# Patient Record
Sex: FEMALE | Race: WHITE | NOT HISPANIC OR LATINO | Employment: FULL TIME | URBAN - NONMETROPOLITAN AREA
[De-identification: names, ages, dates, MRNs, and addresses within clinical notes are randomized per-mention and may not be internally consistent; named-entity substitution may affect disease eponyms.]

---

## 2021-04-13 ENCOUNTER — OFFICE VISIT (OUTPATIENT)
Dept: CARDIOLOGY | Facility: CLINIC | Age: 41
End: 2021-04-13

## 2021-04-13 VITALS
WEIGHT: 156 LBS | DIASTOLIC BLOOD PRESSURE: 102 MMHG | SYSTOLIC BLOOD PRESSURE: 140 MMHG | HEART RATE: 75 BPM | BODY MASS INDEX: 27.64 KG/M2 | TEMPERATURE: 98.6 F | HEIGHT: 63 IN

## 2021-04-13 DIAGNOSIS — I25.6 SILENT MYOCARDIAL ISCHEMIA: ICD-10-CM

## 2021-04-13 DIAGNOSIS — R00.2 PALPITATIONS: ICD-10-CM

## 2021-04-13 DIAGNOSIS — E03.9 HYPOTHYROIDISM, UNSPECIFIED TYPE: ICD-10-CM

## 2021-04-13 DIAGNOSIS — R53.83 FATIGUE, UNSPECIFIED TYPE: ICD-10-CM

## 2021-04-13 DIAGNOSIS — I10 ESSENTIAL HYPERTENSION: Primary | ICD-10-CM

## 2021-04-13 DIAGNOSIS — E88.81 METABOLIC SYNDROME: ICD-10-CM

## 2021-04-13 DIAGNOSIS — R01.1 MURMUR, CARDIAC: ICD-10-CM

## 2021-04-13 DIAGNOSIS — I31.39 PERICARDIAL EFFUSION: ICD-10-CM

## 2021-04-13 DIAGNOSIS — E78.1 HYPERTRIGLYCERIDEMIA: ICD-10-CM

## 2021-04-13 DIAGNOSIS — R94.31 ABNORMAL EKG: ICD-10-CM

## 2021-04-13 DIAGNOSIS — E13.10: ICD-10-CM

## 2021-04-13 PROCEDURE — 93000 ELECTROCARDIOGRAM COMPLETE: CPT | Performed by: INTERNAL MEDICINE

## 2021-04-13 PROCEDURE — 99244 OFF/OP CNSLTJ NEW/EST MOD 40: CPT | Performed by: INTERNAL MEDICINE

## 2021-04-13 RX ORDER — OMEPRAZOLE 40 MG/1
40 CAPSULE, DELAYED RELEASE ORAL DAILY
COMMUNITY

## 2021-04-13 RX ORDER — LEVOTHYROXINE SODIUM 0.03 MG/1
25 TABLET ORAL DAILY
COMMUNITY

## 2021-04-13 RX ORDER — UBIDECARENONE 100 MG
100 CAPSULE ORAL DAILY
COMMUNITY

## 2021-04-13 RX ORDER — FENOFIBRATE 145 MG/1
145 TABLET, COATED ORAL DAILY
COMMUNITY

## 2021-04-13 RX ORDER — PAROXETINE HYDROCHLORIDE 20 MG/1
20 TABLET, FILM COATED ORAL EVERY MORNING
COMMUNITY

## 2021-04-13 RX ORDER — LOSARTAN POTASSIUM 100 MG/1
100 TABLET ORAL DAILY
Qty: 30 TABLET | Refills: 6 | Status: SHIPPED | OUTPATIENT
Start: 2021-04-13

## 2021-04-13 RX ORDER — INSULIN GLARGINE 100 [IU]/ML
INJECTION, SOLUTION SUBCUTANEOUS DAILY
COMMUNITY

## 2021-04-13 RX ORDER — CYCLOBENZAPRINE HCL 10 MG
10 TABLET ORAL 3 TIMES DAILY PRN
COMMUNITY

## 2021-04-13 RX ORDER — NEBIVOLOL 5 MG/1
5 TABLET ORAL DAILY
COMMUNITY
End: 2021-04-13

## 2021-04-13 RX ORDER — ATORVASTATIN CALCIUM 80 MG/1
80 TABLET, FILM COATED ORAL DAILY
COMMUNITY

## 2021-04-13 RX ORDER — LOSARTAN POTASSIUM 50 MG/1
50 TABLET ORAL DAILY
COMMUNITY
End: 2021-04-13

## 2021-04-13 RX ORDER — OMEGA-3-ACID ETHYL ESTERS 1 G/1
2 CAPSULE, LIQUID FILLED ORAL 2 TIMES DAILY
COMMUNITY

## 2021-04-13 RX ORDER — CARVEDILOL 12.5 MG/1
12.5 TABLET ORAL 2 TIMES DAILY
Qty: 180 TABLET | Refills: 3 | Status: SHIPPED | OUTPATIENT
Start: 2021-04-13

## 2021-04-13 RX ORDER — PRAMIPEXOLE DIHYDROCHLORIDE 0.75 MG/1
0.75 TABLET ORAL DAILY
COMMUNITY

## 2021-04-13 NOTE — PROGRESS NOTES
"Chief Complaint   Patient presents with   • Establish Care     PCP referred, HTN   • Hypertension     diagnosed about 8-10 years ago, has really never been well controlled, PCP has tried adjusting numerous med without improvement. Pt has recently changed PCP's, she is wanting cardiac workup and assistance getting BP in a better range. Running around 130/90 on a good day, 150's/98 otherwise.     • Diabetic Ketoacidosis     admitted in January with DKA/pancreatitis for 2 weeks. Printing records, pt reports being\" out of it\" most of the hospital stay, not sure what tests were ordered.    • Labs     PCP checked labs in Feb ( in chart), rechecked last week, requested results. TG's were 4000 in hospital   • Rapid Heart Rate     started about 5 years ago, would get up to the 140's at times but was consistently over 100 \" most all the time\" .  Has improved since her new PCP added Bystolic about a month ago.    • Aspirin     does not take a daily aspirin   • Weight Loss     has lost 44 pounds since discharge from hospital.         CARDIAC COMPLAINTS  dyspnea, Dizziness and palpitations      Subjective   Marielena Crooks is a 40 y.o. female came in today for her initial cardiac evaluation.  She has history of hypertension diagnosed almost 10 years ago and has been very difficult to get it controlled.  She also has been having problem with elevated blood sugar in the past.  In January she got admitted with severe abdominal discomfort and found to have acute pancreatitis.  It was felt at that time that the acute pancreatitis happened secondary to hypertriglyceridemia.  She did undergo a CT scan which according to the report showed evidence of acute pancreatitis.  There is evidence of sludge in the gallbladder as well as the common bile duct and also small to moderate pericardial effusion.  She was in the hospital for almost 2 weeks but from what I gather that she never had any kind of cardiac work-up.  She was discharged home " after controlling her blood sugar with the recommendation to see endocrinologist as well as gastroenterologist.  She also change her primary care physician at this time.  She has been noticing palpitation in the form of increased heart rate where the heart rate has gone up about 140 multiple times.  She now got little better after Bystolic was added but unfortunately it is difficult to get it pre authorization for this medication.  She also has lost about 44 pounds since discharge both with diet as well as with her appetite change.  She denies having any chest pain.  She does have some shortness of breath and feeling extremely fatigued and tired.  Her lab work while she was in the hospital showed lipase of thousand 386.  Her triglyceride was more than 4000 with a total cholesterol of 347 and LDL of 157.  She was put on a statin as well as fenofibrate.  She also had an ultrasound of the gallbladder which showed evidence of hepatomegaly.  After coming home she had more labs done at your office.  Her cholesterol is better at 188.  Her triglyceride did come down to 250 and the LDL is around 121.  Her vitamin D was low at 15.4.  Her thyroid function test at this time is normal.  Her A1c is around 9.8.  She has no history of smoking.  She does have a very strong family history of ischemic heart disease.  Her father had his first stent when he was very young.  Her mother also has history of premature coronary artery disease.    Past Surgical History:   Procedure Laterality Date   • OTHER SURGICAL HISTORY  02/01/2021    CT Abd- S-M Pericardial Effusion. Acute Pancreatitis. Sludge in GB       Current Outpatient Medications   Medication Sig Dispense Refill   • atorvastatin (LIPITOR) 80 MG tablet Take 80 mg by mouth Daily.     • coenzyme Q10 100 MG capsule Take 100 mg by mouth Daily.     • cyclobenzaprine (FLEXERIL) 10 MG tablet Take 10 mg by mouth 3 (Three) Times a Day As Needed for Muscle Spasms.     • fenofibrate (TRICOR)  145 MG tablet Take 145 mg by mouth Daily.     • insulin glargine (LANTUS, SEMGLEE) 100 UNIT/ML injection Inject  under the skin into the appropriate area as directed Daily.     • levothyroxine (SYNTHROID, LEVOTHROID) 25 MCG tablet Take 25 mcg by mouth Daily.     • omega-3 acid ethyl esters (Lovaza) 1 g capsule Take 2 g by mouth 2 (Two) Times a Day.     • omeprazole (priLOSEC) 40 MG capsule Take 40 mg by mouth Daily.     • PARoxetine (PAXIL) 20 MG tablet Take 20 mg by mouth Every Morning.     • pramipexole (Mirapex) 0.75 MG tablet Take 0.75 mg by mouth Daily.     • carvedilol (COREG) 12.5 MG tablet Take 1 tablet by mouth 2 (Two) Times a Day. 180 tablet 3   • losartan (COZAAR) 100 MG tablet Take 1 tablet by mouth Daily. 30 tablet 6     No current facility-administered medications for this visit.           ALLERGIES:  Topamax [topiramate]    Past Medical History:   Diagnosis Date   • Anxiety    • Depression    • Diabetes mellitus (CMS/[facility])    • GERD (gastroesophageal reflux disease)    • Heart murmur    • History of bilateral carpal tunnel release    • History of hysterectomy    • History of tubal ligation    • Hyperlipidemia    • Hypertension    • Kidney stones    • Plantar fasciitis, bilateral        Social History     Tobacco Use   Smoking Status Never Smoker   Smokeless Tobacco Never Used          Family History   Problem Relation Age of Onset   • Heart disease Mother         s/p multiple stents   • Hyperlipidemia Mother    • Hypertension Mother    • Heart attack Mother         MI at 58   • Heart failure Father    • Heart attack Father         first MI in his mid 20's   • COPD Father    • Heart disease Father         multiple stents, CABG   • Other Father         PPM/ICD   • Diabetes Father    • No Known Problems Sister    • No Known Problems Brother    • Other Maternal Grandmother         medical history unknown   • Other Maternal Grandfather         medical history unknown   • Cancer Paternal Grandmother    •  "Other Paternal Grandfather         medical history unknown   • No Known Problems Son        Review of Systems   Constitutional: Positive for malaise/fatigue and weight loss. Negative for decreased appetite.   HENT: Negative for congestion and sore throat.    Eyes: Negative for blurred vision.   Cardiovascular: Positive for palpitations. Negative for chest pain.   Respiratory: Positive for snoring. Negative for shortness of breath.    Endocrine: Negative for cold intolerance and heat intolerance.   Hematologic/Lymphatic: Negative for adenopathy. Does not bruise/bleed easily.   Skin: Negative for itching, nail changes and skin cancer.   Musculoskeletal: Negative for arthritis and myalgias.   Gastrointestinal: Negative for abdominal pain, dysphagia and heartburn.   Genitourinary: Negative for bladder incontinence and frequency.   Neurological: Positive for excessive daytime sleepiness. Negative for dizziness, light-headedness, seizures and vertigo.   Psychiatric/Behavioral: Negative for altered mental status.   Allergic/Immunologic: Negative for environmental allergies and hives.       Diabetes- Yes  Thyroid- abnormal    Objective     BP (!) 140/102 (BP Location: Left arm)   Pulse 75   Temp 98.6 °F (37 °C)   Ht 160 cm (63\")   Wt 70.8 kg (156 lb)   BMI 27.63 kg/m²     Vitals and nursing note reviewed.   Constitutional:       Appearance: Healthy appearance. Not in distress.   Eyes:      Pupils: Pupils are equal, round, and reactive to light.   HENT:      Head: Normocephalic.   Pulmonary:      Effort: Pulmonary effort is normal.      Breath sounds: Normal breath sounds.   Cardiovascular:      PMI at left midclavicular line. Normal rate. Regular rhythm.   Abdominal:      General: Bowel sounds are normal.      Palpations: Abdomen is soft.   Musculoskeletal: Normal range of motion.      Cervical back: Normal range of motion and neck supple. Skin:     General: Skin is warm and dry.   Neurological:      Mental Status: " Alert, oriented to person, place, and time and oriented to person, place and time.           ECG 12 Lead    Date/Time: 4/13/2021 12:58 PM  Performed by: Clarice Malin MD  Authorized by: Clarice Malin MD   Previous ECG: no previous ECG available  Rhythm: sinus rhythm  Rate: normal  QRS axis: normal  Other findings: non-specific ST-T wave changes    Clinical impression: non-specific ECG              Assessment/Plan   Patient's Body mass index is 27.63 kg/m². BMI is above normal parameters. Recommendations include: educational material, exercise counseling and nutrition counseling.     Diagnoses and all orders for this visit:    1. Essential hypertension (Primary)  -     losartan (COZAAR) 100 MG tablet; Take 1 tablet by mouth Daily.  Dispense: 30 tablet; Refill: 6  -     carvedilol (COREG) 12.5 MG tablet; Take 1 tablet by mouth 2 (Two) Times a Day.  Dispense: 180 tablet; Refill: 3  -     Stress Test With Myocardial Perfusion One Day; Future  -     Adult Transthoracic Echo Complete W/ Cont if Necessary Per Protocol; Future    2. Secondary diabetes mellitus with ketoacidosis, uncontrolled (CMS/HCC)    3. Palpitations  -     carvedilol (COREG) 12.5 MG tablet; Take 1 tablet by mouth 2 (Two) Times a Day.  Dispense: 180 tablet; Refill: 3  -     Stress Test With Myocardial Perfusion One Day; Future    4. Hypertriglyceridemia    5. Metabolic syndrome    6. Hypothyroidism, unspecified type    7. Silent myocardial ischemia  -     Stress Test With Myocardial Perfusion One Day; Future    8. Abnormal EKG  -     Stress Test With Myocardial Perfusion One Day; Future    9. Murmur, cardiac  -     Adult Transthoracic Echo Complete W/ Cont if Necessary Per Protocol; Future    10. Fatigue, unspecified type  -     Overnight Sleep Oximetry Study; Future    11. Pericardial effusion    At baseline her heart rate is stable, but the blood pressure is moderately elevated.  Her EKG showed normal sinus rhythm with diffuse T wave  changes.  Her clinical examination reveals a BMI of 28.  Her cardiovascular examination reveals soft heart sounds with a soft systolic murmur at the mitral area.    Regarding her blood pressure it is still significantly elevated.  I increased the dose of Cozaar to 100 mg once a day.  At this time I changed Bystolic to Coreg at 12.5 mg twice a day.  If she fails 2 different beta-blockers then it will be easier to get Bystolic preauthorized.  Hopefully with the increasing dose of Cozaar the blood pressure will also get little better.    Regarding her diabetes, she is now on insulin and she claims most of the blood sugar value appears to be better controlled.  Continue the same    Regarding the hypertriglyceridemia and hypercholesterolemia, she is tolerating the high-dose of Lipitor along with the Tricor.  Continue the same for now and recheck it again in 3 to 6 months along with the LFT.    Regarding the hypothyroidism she is taking supplements and the last TSH was very well controlled.    Regarding the abnormal EKG, I had a long talk with her about silent ischemia in diabetic patients.  I scheduled her to undergo a stress test to evaluate her functional status, chronotropic response, blood pressure response and rule out any stress-induced ischemia.  At this time I did not start her on any aspirin but if the stress test is abnormal then aspirin will be started.    Regarding the cardiac murmur and also the evidence of pericardial effusion by CT scan, she needs further work-up.  I scheduled her to undergo an echocardiogram to evaluate the LV function, valvular structures, PA pressure and also to evaluate for pericardial effusion.    Regarding her metabolic syndrome, I encouraged her to continue with the diet and weight reduction.  I did give her papers on Mediterranean diet.    Regarding her chronic fatigue, she does have some symptoms suggestive of sleep apnea.  I advised her to have a nocturnal pulse PO2 measurement  done.    Based on the results, further recommendations will be made.               Electronically signed by Clarice Malin MD April 13, 2021 12:47 EDT

## 2021-04-13 NOTE — PATIENT INSTRUCTIONS
Mediterranean Diet  A Mediterranean diet refers to food and lifestyle choices that are based on the traditions of countries located on the [place]. This way of eating has been shown to help prevent certain conditions and improve outcomes for people who have chronic diseases, like kidney disease and heart disease.  What are tips for following this plan?  Lifestyle  · Cook and eat meals together with your family, when possible.  · Drink enough fluid to keep your urine clear or pale yellow.  · Be physically active every day. This includes:  ? Aerobic exercise like running or swimming.  ? Leisure activities like gardening, walking, or housework.  · Get 7-8 hours of sleep each night.  · If recommended by your health care provider, drink red wine in moderation. This means 1 glass a day for nonpregnant women and 2 glasses a day for men. A glass of wine equals 5 oz (150 mL).  Reading food labels    · Check the serving size of packaged foods. For foods such as rice and pasta, the serving size refers to the amount of cooked product, not dry.  · Check the total fat in packaged foods. Avoid foods that have saturated fat or trans fats.  · Check the ingredients list for added sugars, such as corn syrup.  Shopping  · At the grocery store, buy most of your food from the areas near the walls of the store. This includes:  ? Fresh fruits and vegetables (produce).  ? Grains, beans, nuts, and seeds. Some of these may be available in unpackaged forms or large amounts (in bulk).  ? Fresh seafood.  ? Poultry and eggs.  ? Low-fat dairy products.  · Buy whole ingredients instead of prepackaged foods.  · Buy fresh fruits and vegetables in-season from local farmers markets.  · Buy frozen fruits and vegetables in resealable bags.  · If you do not have access to quality fresh seafood, buy precooked frozen shrimp or canned fish, such as tuna, salmon, or sardines.  · Buy small amounts of raw or cooked vegetables, salads, or olives from  the deli or salad bar at your store.  · Stock your pantry so you always have certain foods on hand, such as olive oil, canned tuna, canned tomatoes, rice, pasta, and beans.  Cooking  · Cook foods with extra-virgin olive oil instead of using butter or other vegetable oils.  · Have meat as a side dish, and have vegetables or grains as your main dish. This means having meat in small portions or adding small amounts of meat to foods like pasta or stew.  · Use beans or vegetables instead of meat in common dishes like chili or lasagna.  · [place] with different cooking methods. Try roasting or broiling vegetables instead of steaming or sautéeing them.  · Add frozen vegetables to soups, stews, pasta, or rice.  · Add nuts or seeds for added healthy fat at each meal. You can add these to yogurt, salads, or vegetable dishes.  · Marinate fish or vegetables using olive oil, lemon juice, garlic, and fresh herbs.  Meal planning    · Plan to eat 1 vegetarian meal one day each week. Try to work up to 2 vegetarian meals, if possible.  · Eat seafood 2 or more times a week.  · Have healthy snacks readily available, such as:  ? Vegetable sticks with hummus.  ? Greek yogurt.  ? Fruit and nut trail mix.  · Eat balanced meals throughout the week. This includes:  ? Fruit: 2-3 servings a day  ? Vegetables: 4-5 servings a day  ? Low-fat dairy: 2 servings a day  ? Fish, poultry, or lean meat: 1 serving a day  ? Beans and legumes: 2 or more servings a week  ? Nuts and seeds: 1-2 servings a day  ? Whole grains: 6-8 servings a day  ? Extra-virgin olive oil: 3-4 servings a day  · Limit red meat and sweets to only a few servings a month  What are my food choices?  · Mediterranean diet  ? Recommended  § Grains: Whole-grain pasta. Brown rice. Bulgar wheat. Polenta. Couscous. Whole-wheat bread. Oatmeal. Quinoa.  § Vegetables: Artichokes. Beets. Broccoli. Cabbage. Carrots. Eggplant. Green beans. Chard. Kale. Spinach. Onions. Leeks. Peas. Squash.  Tomatoes. Peppers. Radishes.  § Fruits: Apples. Apricots. Avocado. Berries. Bananas. Cherries. Dates. Figs. Grapes. Ken. Melon. Oranges. Peaches. Plums. Pomegranate.  § Meats and other protein foods: Beans. Almonds. Sunflower seeds. Pine nuts. Peanuts. Cod. [place]. Scallops. Shrimp. Tuna. Tilapia. Clams. Oysters. Eggs.  § Dairy: Low-fat milk. Cheese. Greek yogurt.  § Beverages: Water. Red wine. Herbal tea.  § Fats and oils: Extra virgin olive oil. Avocado oil. Grape seed oil.  § Sweets and desserts: Greek yogurt with honey. Baked apples. Poached pears. Trail mix.  § Seasoning and other foods: Basil. Cilantro. Coriander. Cumin. Mint. Parsley. [place]. Rosemary. Tarragon. Garlic. Oregano. Thyme. Pepper. Balsalmic vinegar. Tahini. Hummus. Tomato sauce. Olives. Mushrooms.  ? Limit these  § Grains: Prepackaged pasta or rice dishes. Prepackaged cereal with added sugar.  § Vegetables: Deep fried potatoes (french fries).  § Fruits: Fruit canned in syrup.  § Meats and other protein foods: Beef. Pork. Lamb. Poultry with skin. Hot dogs. [place].  § Dairy: Ice cream. Sour cream. Whole milk.  § Beverages: Juice. Sugar-sweetened soft drinks. Beer. Liquor and spirits.  § Fats and oils: Butter. Canola oil. Vegetable oil. Beef fat (tallow). Lard.  § Sweets and desserts: Cookies. Cakes. Pies. Candy.  § Seasoning and other foods: Mayonnaise. Premade sauces and marinades.  The items listed may not be a complete list. Talk with your dietitian about what dietary choices are right for you.  Summary  · The Mediterranean diet includes both food and lifestyle choices.  · Eat a variety of fresh fruits and vegetables, beans, nuts, seeds, and whole grains.  · Limit the amount of red meat and sweets that you eat.  · Talk with your health care provider about whether it is safe for you to drink red wine in moderation. This means 1 glass a day for nonpregnant women and 2 glasses a day for men. A glass of wine equals 5 oz (150 mL).  This information  is not intended to replace advice given to you by your health care provider. Make sure you discuss any questions you have with your health care provider.  Document Revised: 08/17/2017 Document Reviewed: 08/10/2017  Elsevier Patient Education © 2020 [employer]

## 2021-05-13 ENCOUNTER — APPOINTMENT (OUTPATIENT)
Dept: CARDIOLOGY | Facility: HOSPITAL | Age: 41
End: 2021-05-13

## 2021-05-21 ENCOUNTER — HOSPITAL ENCOUNTER (OUTPATIENT)
Dept: CARDIOLOGY | Facility: HOSPITAL | Age: 41
Discharge: HOME | End: 2021-05-21

## 2021-05-21 DIAGNOSIS — R01.1 MURMUR, CARDIAC: ICD-10-CM

## 2021-05-21 DIAGNOSIS — I25.6 SILENT MYOCARDIAL ISCHEMIA: ICD-10-CM

## 2021-05-21 DIAGNOSIS — I10 ESSENTIAL HYPERTENSION: ICD-10-CM

## 2021-05-21 DIAGNOSIS — R94.31 ABNORMAL EKG: ICD-10-CM

## 2021-05-21 DIAGNOSIS — R00.2 PALPITATIONS: ICD-10-CM

## 2021-05-21 PROCEDURE — 78452 HT MUSCLE IMAGE SPECT MULT: CPT

## 2021-05-21 PROCEDURE — 93018 CV STRESS TEST I&R ONLY: CPT | Performed by: INTERNAL MEDICINE

## 2021-05-21 PROCEDURE — A9500 TC99M SESTAMIBI: HCPCS | Performed by: INTERNAL MEDICINE

## 2021-05-21 PROCEDURE — 0 TECHNETIUM SESTAMIBI: Performed by: INTERNAL MEDICINE

## 2021-05-21 PROCEDURE — 93306 TTE W/DOPPLER COMPLETE: CPT

## 2021-05-21 PROCEDURE — 93017 CV STRESS TEST TRACING ONLY: CPT

## 2021-05-21 PROCEDURE — 93306 TTE W/DOPPLER COMPLETE: CPT | Performed by: INTERNAL MEDICINE

## 2021-05-21 PROCEDURE — 78452 HT MUSCLE IMAGE SPECT MULT: CPT | Performed by: INTERNAL MEDICINE

## 2021-05-21 RX ADMIN — TECHNETIUM TC 99M SESTAMIBI 1 DOSE: 1 INJECTION INTRAVENOUS at 12:38

## 2021-05-21 RX ADMIN — TECHNETIUM TC 99M SESTAMIBI 1 DOSE: 1 INJECTION INTRAVENOUS at 14:33

## 2021-05-22 LAB
BH CV ECHO MEAS - ACS: 1.7 CM
BH CV ECHO MEAS - AO MAX PG: 10.5 MMHG
BH CV ECHO MEAS - AO MEAN PG: 6 MMHG
BH CV ECHO MEAS - AO ROOT AREA (BSA CORRECTED): 1.4
BH CV ECHO MEAS - AO ROOT AREA: 4.7 CM^2
BH CV ECHO MEAS - AO ROOT DIAM: 2.5 CM
BH CV ECHO MEAS - AO V2 MAX: 162 CM/SEC
BH CV ECHO MEAS - AO V2 MEAN: 117 CM/SEC
BH CV ECHO MEAS - AO V2 VTI: 33.5 CM
BH CV ECHO MEAS - BSA(HAYCOCK): 1.8 M^2
BH CV ECHO MEAS - BSA: 1.7 M^2
BH CV ECHO MEAS - BZI_BMI: 27.6 KILOGRAMS/M^2
BH CV ECHO MEAS - BZI_METRIC_HEIGHT: 160 CM
BH CV ECHO MEAS - BZI_METRIC_WEIGHT: 70.8 KG
BH CV ECHO MEAS - EDV(CUBED): 86.9 ML
BH CV ECHO MEAS - EDV(MOD-SP4): 82.9 ML
BH CV ECHO MEAS - EDV(TEICH): 89.1 ML
BH CV ECHO MEAS - EF(CUBED): 84.9 %
BH CV ECHO MEAS - EF(MOD-SP4): 54.6 %
BH CV ECHO MEAS - EF(TEICH): 78.3 %
BH CV ECHO MEAS - ESV(CUBED): 13.1 ML
BH CV ECHO MEAS - ESV(MOD-SP4): 37.6 ML
BH CV ECHO MEAS - ESV(TEICH): 19.3 ML
BH CV ECHO MEAS - FS: 46.7 %
BH CV ECHO MEAS - IVS/LVPW: 0.76
BH CV ECHO MEAS - IVSD: 0.84 CM
BH CV ECHO MEAS - LA DIMENSION: 3.9 CM
BH CV ECHO MEAS - LA/AO: 1.6
BH CV ECHO MEAS - LV DIASTOLIC VOL/BSA (35-75): 47.6 ML/M^2
BH CV ECHO MEAS - LV IVRT: 0.1 SEC
BH CV ECHO MEAS - LV MASS(C)D: 142.8 GRAMS
BH CV ECHO MEAS - LV MASS(C)DI: 82.1 GRAMS/M^2
BH CV ECHO MEAS - LV SYSTOLIC VOL/BSA (12-30): 21.6 ML/M^2
BH CV ECHO MEAS - LVIDD: 4.4 CM
BH CV ECHO MEAS - LVIDS: 2.4 CM
BH CV ECHO MEAS - LVLD AP4: 7.7 CM
BH CV ECHO MEAS - LVLS AP4: 7.2 CM
BH CV ECHO MEAS - LVOT AREA (M): 3.1 CM^2
BH CV ECHO MEAS - LVOT AREA: 3.1 CM^2
BH CV ECHO MEAS - LVOT DIAM: 2 CM
BH CV ECHO MEAS - LVPWD: 1.1 CM
BH CV ECHO MEAS - MV A MAX VEL: 81.5 CM/SEC
BH CV ECHO MEAS - MV DEC SLOPE: 388 CM/SEC^2
BH CV ECHO MEAS - MV E MAX VEL: 98.5 CM/SEC
BH CV ECHO MEAS - MV E/A: 1.2
BH CV ECHO MEAS - RVDD: 2.4 CM
BH CV ECHO MEAS - SI(AO): 90.8 ML/M^2
BH CV ECHO MEAS - SI(CUBED): 42.4 ML/M^2
BH CV ECHO MEAS - SI(MOD-SP4): 26 ML/M^2
BH CV ECHO MEAS - SI(TEICH): 40.1 ML/M^2
BH CV ECHO MEAS - SV(AO): 157.9 ML
BH CV ECHO MEAS - SV(CUBED): 73.8 ML
BH CV ECHO MEAS - SV(MOD-SP4): 45.3 ML
BH CV ECHO MEAS - SV(TEICH): 69.8 ML
BH CV REST NUCLEAR ISOTOPE DOSE: 10 MCI
BH CV STRESS NUCLEAR ISOTOPE DOSE: 30 MCI
BH CV STRESS RECOVERY BP: NORMAL MMHG
BH CV STRESS RECOVERY HR: 99 BPM
LV EF NUC BP: 63 %
MAXIMAL PREDICTED HEART RATE: 179 BPM
MAXIMAL PREDICTED HEART RATE: 179 BPM
PERCENT MAX PREDICTED HR: 85.47 %
STRESS BASELINE BP: NORMAL MMHG
STRESS BASELINE HR: 94 BPM
STRESS PERCENT HR: 101 %
STRESS POST ESTIMATED WORKLOAD: 7 METS
STRESS POST EXERCISE DUR MIN: 6 MIN
STRESS POST EXERCISE DUR SEC: 0 SEC
STRESS POST PEAK BP: NORMAL MMHG
STRESS POST PEAK HR: 153 BPM
STRESS TARGET HR: 152 BPM
STRESS TARGET HR: 152 BPM

## 2021-05-24 ENCOUNTER — TELEPHONE (OUTPATIENT)
Dept: CARDIOLOGY | Facility: CLINIC | Age: 41
End: 2021-05-24

## 2021-05-24 RX ORDER — AMLODIPINE BESYLATE 5 MG/1
5 TABLET ORAL DAILY
Qty: 30 TABLET | Refills: 6 | Status: SHIPPED | OUTPATIENT
Start: 2021-05-24 | End: 2021-11-04

## 2021-05-24 NOTE — TELEPHONE ENCOUNTER
----- Message from Marielena Crooks sent at 5/22/2021 10:56 AM EDT -----  Regarding: Non-Urgent Medical Question  Contact: [phone]  What exactly do the results mean? Is everything okay? Do I have something I need to be worried about? I don't know what any of those results mean, sorry.

## 2021-05-24 NOTE — TELEPHONE ENCOUNTER
I called patient.  She is at a doctor's appointment and states she will call me back to go over results.

## 2021-05-24 NOTE — TELEPHONE ENCOUNTER
----- Message from Clarice Malin MD sent at 5/22/2021 10:05 AM EDT -----  Norvasc  Cath is still has problem

## 2021-11-04 RX ORDER — AMLODIPINE BESYLATE 5 MG/1
TABLET ORAL
Qty: 90 TABLET | Refills: 3 | Status: SHIPPED | OUTPATIENT
Start: 2021-11-04